# Patient Record
Sex: FEMALE | Race: WHITE
[De-identification: names, ages, dates, MRNs, and addresses within clinical notes are randomized per-mention and may not be internally consistent; named-entity substitution may affect disease eponyms.]

---

## 2017-04-25 ENCOUNTER — HOSPITAL ENCOUNTER (EMERGENCY)
Dept: HOSPITAL 72 - EMR | Age: 82
Discharge: HOME | End: 2017-04-25
Payer: MEDICARE

## 2017-04-25 VITALS — SYSTOLIC BLOOD PRESSURE: 128 MMHG | DIASTOLIC BLOOD PRESSURE: 70 MMHG

## 2017-04-25 VITALS — BODY MASS INDEX: 25.76 KG/M2 | HEIGHT: 62 IN | WEIGHT: 140 LBS
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
## 2017-04-25 DIAGNOSIS — S00.83XA: Primary | ICD-10-CM
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
## 2017-04-25 DIAGNOSIS — I10: ICD-10-CM

## 2017-04-25 PROCEDURE — 70486 CT MAXILLOFACIAL W/O DYE: CPT

## 2017-04-25 PROCEDURE — 99284 EMERGENCY DEPT VISIT MOD MDM: CPT

## 2017-04-25 NOTE — EMERGENCY ROOM REPORT
History of Present Illness


General


Chief Complaint:  Multiple Trauma/Fall


Source:  Patient





Present Illness


HPI


The patient is an 81-year-old female who denies any medical history presenting 

for facial pain after falling off of a bench 5 days prior.  She has not been 

seen prior to this.  It is described as a 5/10 dull ache to the left side of 

the face.  She has also noticed bruising and swelling to the region.  No 

radiating pain. She denies any other symptoms including N, V, HA, dizziness, 

blurred vision, CP, SOB





She denies taking aspirin or any blood thinner


Allergies:  


Coded Allergies:  


     No Known Allergies (Unverified , 4/25/17)





Patient History


Past Medical History:  see triage record


Pertinent Family History:  none


Reviewed Nursing Documentation:  PMH: Agreed, PSxH: Agreed





Nursing Documentation-PMH


Past Medical History:  No History, Except For


Hx Hypertension:  Yes





Review of Systems


All Other Systems:  negative except mentioned in HPI





Physical Exam





Vital Signs








  Date Time  Temp Pulse Resp B/P Pulse Ox O2 Delivery O2 Flow Rate FiO2


 


4/25/17 17:14 97.3 69 16 130/73 99 Room Air  








Sp02 EP Interpretation:  reviewed, normal


General Appearance:  no apparent distress, alert, GCS 15, non-toxic


Head:  normocephalic, atraumatic, other - edema and eccymosis to L periorbital 

region and L forehead


Eyes:  bilateral eye PERRL, bilateral eye normal inspection


ENT:  hearing grossly normal, normal pharynx, no angioedema, normal voice


Neck:  full range of motion, supple/symm/no masses


Musculoskeletal:  back normal, gait/station normal, normal range of motion, non-

tender


Neurologic:  alert, oriented x3, responsive, motor strength/tone normal, 

sensory intact, speech normal


Psychiatric:  judgement/insight normal, memory normal, mood/affect normal, no 

suicidal/homicidal ideation


Skin:  normal color, no rash, warm/dry, well hydrated


Lymphatic:  no adenopathy





Medical Decision Making


PA Attestation


Dr. Meehan is my supervising physician. Patient management was discussed with 

my supervising physician


Diagnostic Impression:  


 Primary Impression:  


 Facial contusion


 Qualified Codes:  S00.83XA - Contusion of other part of head, initial encounter


ER Course


The patient is an 81-year-old female who denies any medical history presenting 

for facial pain





Ddx considered include but not limited to sprain/strain, fracture, contusion, 

abrasion





Physical exam: Vitals are within normal limits.  No apparent distress


HEENT: There is edema and ecchymosis to the left periorbital region and left 

forehead.  Tender to palpation.


EOMI. 


PERRL


Otherwise exam is unremarkable





CT facial bones shows soft tissue swelling only.





The patient will be discharged home with pain medication and will continue to 

ice the area.  ER precautions given


CT/MRI/US Diagnostic Results


CT/MRI/US Diagnostic Results :  


   Imaging Test Ordered:  CT face


   Impression


Soft tissue swelling. Otherwise unremarkable





Last Vital Signs








  Date Time  Temp Pulse Resp B/P Pulse Ox O2 Delivery O2 Flow Rate FiO2


 


4/25/17 19:09  72 18 128/70 98 Room Air  


 


4/25/17 19:09 97.8       








Status:  improved


Disposition:  HOME, SELF-CARE


Condition:  Improved


Scripts


Acetaminophen* (TYLENOL EXTRA STRENGTH*) 500 Mg Tablet


500 MG ORAL Q8H Y for Prn Headache/Temp > 101, #30 TAB 0 Refills


   Prov: SANTOSH MONTES DE OCA         4/25/17


Patient Instructions:  Facial or Scalp Contusion





Additional Instructions:  


I discussed my findings with the patient. All questions and concerns have been 

answered. Treatment and medication compliance have been addressed. I advised 

the patient that they need to follow up with PMD in 3-5 days. Return to ED if 

symptoms worsen, new symptoms arise, or if needed for any reason. Patient 

verbalized understanding of discharge instructions.











SANTOSH MONTES DE OCA Apr 25, 2017 22:49

## 2017-04-26 NOTE — DIAGNOSTIC IMAGING REPORT
Indications: Orbital trauma, pain, swelling



Technique: Spiral images obtained through the facial bones. No IV contrast 

utilized.

Multiplanar reconstructions were generated.Total dose length product 41 mGycm.

CTDIvol(s) 20mGy. Dose reduction achieved using automated exposure control



Comparison: None



Findings: There is minimal left or low supraorbital soft tissue acute fractures. 

No

worrisome sinus opacification. The optic globes and retroseptal orbits are 

intact.

Included intracranial structures are unremarkable.



Impression: Minimal soft tissue swelling.



No acute bony trauma



This agrees with the preliminary interpretation provided overnight by Dr. Velazquez



The CT scanner at [facility] is accredited by the [employer] 

of

Radiology and the scans are performed using protocols designed to limit 

radiation

exposure to as low as reasonably achievable to attain images of sufficient

resolution adequate for diagnostic evaluation.

## 2018-08-21 ENCOUNTER — HOSPITAL ENCOUNTER (OUTPATIENT)
Dept: HOSPITAL 72 - PTY | Age: 83
LOS: 10 days | Discharge: HOME | End: 2018-08-31
Payer: MEDICARE

## 2018-08-21 DIAGNOSIS — R26.89: Primary | ICD-10-CM

## 2018-08-21 DIAGNOSIS — Z96.652: ICD-10-CM

## 2018-08-21 PROCEDURE — 97161 PT EVAL LOW COMPLEX 20 MIN: CPT

## 2018-08-21 PROCEDURE — 97110 THERAPEUTIC EXERCISES: CPT
